# Patient Record
Sex: FEMALE | Race: WHITE
[De-identification: names, ages, dates, MRNs, and addresses within clinical notes are randomized per-mention and may not be internally consistent; named-entity substitution may affect disease eponyms.]

---

## 2019-12-11 ENCOUNTER — HOSPITAL ENCOUNTER (EMERGENCY)
Dept: HOSPITAL 41 - JD.ED | Age: 24
Discharge: HOME | End: 2019-12-11
Payer: COMMERCIAL
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
## 2019-12-11 DIAGNOSIS — F41.9: ICD-10-CM

## 2019-12-11 DIAGNOSIS — T14.8XXA: ICD-10-CM

## 2019-12-11 DIAGNOSIS — F32.9: ICD-10-CM

## 2019-12-11 DIAGNOSIS — F07.81: Primary | ICD-10-CM

## 2019-12-11 NOTE — EDM.PDOC
ED HPI GENERAL MEDICAL PROBLEM





- General


Chief Complaint: Head Injury


Stated Complaint: HEAD INJURY


Time Seen by Provider: 12/11/19 11:08


Source of Information: Reports: Patient, RN Notes Reviewed


History Limitations: Reports: No Limitations





- History of Present Illness


INITIAL COMMENTS - FREE TEXT/NARRATIVE: 





Patient is a 24-year-old female who presents to the ED with family members for 

the evaluation of a head injury.  Patient notes that around 8:00 last night, 

she slipped and fell on the ice.  She notes that she slipped backwards and hit 

the back of her head, and her back on the ice.  She denies any loss of 

consciousness or blacking out.  There is no bleeding noted as well.  She is not 

complaining of any sort of pain or injuries.  Today she is complaining of this 

inability to not concentrate, or focus, with a lot of fatigue that she does not 

normally have.  She denies any nausea or vomiting or headache, or blurred or 

double vision.  Patient does note some mild lower right rib cage pain, or upper 

abdominal pain, that is sharp if she bends over.  She noticed that this morning 

directly when she woke up.  She did not take any sort of pain medications for 

this.





- Related Data


 Allergies











Allergy/AdvReac Type Severity Reaction Status Date / Time


 


No Known Allergies Allergy   Verified 12/11/19 10:37











Home Meds: 


 Home Meds





Cholecalciferol (Vitamin D3) [Vitamin D3] 10,000 units PO DAILY 12/11/19 [

History]


Escitalopram [Lexapro] 10 mg PO DAILY 12/11/19 [History]


Spironolactone [Aldactone] 50 mg PO DAILY 12/11/19 [History]











Past Medical History


OB/GYN History: Reports: Polycystic Ovaries


Psychiatric History: Reports: Anxiety, Depression





- Infectious Disease History


Other Infectious Disease History: Refusd flu shot





Social & Family History





- Tobacco Use


Smoking Status *Q: Never Smoker


Second Hand Smoke Exposure: Yes





- Caffeine Use


Caffeine Use: Reports: Coffee





- Alcohol Use


Days Per Week of Alcohol Use: 1


Number of Drinks Per Day: 3


Total Drinks Per Week: 3





- Recreational Drug Use


Recreational Drug Use: No





ED ROS GENERAL





- Review of Systems


Review Of Systems: See Below


Constitutional: Reports: Fatigue.  Denies: Fever, Chills


Respiratory: Denies: Shortness of Breath


Cardiovascular: Reports: Chest Pain (R lower rib cage/R upper abdominal)


GI/Abdominal: Reports: Abdominal Pain (R upper abdominal pain).  Denies: Nausea

, Vomiting


Musculoskeletal: Denies: Neck Pain, Shoulder Pain, Arm Pain, Back Pain


Neurological: Denies: Confusion, Headache, Syncope, Trouble Speaking, 

Difficulty Walking





ED EXAM, HEAD INJURY





- Physical Exam


Exam: See Below


Exam Limited By: No Limitations


General Appearance: Alert, WD/WN, No Apparent Distress


Head: Atraumatic, Normocephalic


Nexus Criteria: No: Posterior, Midline Cervical Tenderness, Evidence of 

Intoxication, Altered Level of Consciousness, Focal Neurological Deficit, 

Painful Distraction Injuries


Eyes: Bilateral Eye: EOMI, Normal Inspection, PERRL


Ears: Normal External Exam, Normal Canal, Hearing Grossly Normal, Normal TMs


Nose: Normal Inspection


Throat/Mouth: Normal Inspection, Normal Lips, Normal Teeth, Normal Gums, Normal 

Oropharynx, Normal Voice, No Airway Compromise


Neck: Non-Tender, Full Range of Motion, Normal Alignment, Normal Inspection


Respiratory: No Respiratory Distress, Lungs Clear, Normal Breath Sounds, No 

Accessory Muscle Use, Chest Non-Tender


Cardiovascular: Normal Peripheral Pulses, Regular Rate, Rhythm, No Murmur


GI/Abdominal Exam: Normal Bowel Sounds, Soft, Non-Tender, No Distention, No Mass


Extremities: Normal Inspection, Normal Capillary Refill


Neurologic: CNs II-XII nml As Tested, No Motor/Sensory Deficits, Alert, Normal 

Mood/Affect, Oriented x 3


Skin: Normal Color, Warm/Dry





- [place] Coma Score


Best Eye Response (López): (4) Open Spontaneously


Best Verbal Response (López): (5) Oriented


Best Motor Response (López): (6) Obeys Commands


[place] Total: 15





Course





- Vital Signs


Last Recorded V/S: 





 Last Vital Signs











Temp  97.6 F   12/11/19 10:30


 


Pulse  81   12/11/19 10:30


 


Resp  18   12/11/19 10:30


 


BP  136/76   12/11/19 10:30


 


Pulse Ox  99   12/11/19 10:30














- Re-Assessments/Exams


Free Text/Narrative Re-Assessment/Exam: 





12/11/19 11:40


Patient presents to the ED for evaluation of a head injury.  Clinically she is 

suffering from concussion.  I believe that the right lower rib cage/upper 

abdominal pain is more musculoskeletal in nature, it is reproduced on palpation 

of the lower chest.  We will have her follow general conservative 

recommendations and discharge home at this time.





Departure





- Departure


Time of Disposition: 11:40


Disposition: Home, Self-Care 01


Clinical Impression: 


 Post concussion syndrome, Musculoskeletal strain








- Discharge Information


*PRESCRIPTION DRUG MONITORING PROGRAM REVIEWED*: No


*COPY OF PRESCRIPTION DRUG MONITORING REPORT IN PATIENT SANTIAGO: No


Instructions:  Post-Concussion Syndrome, Easy-to-Read


Referrals: 


Kacie Gil PA [Primary Care Provider] - 


Forms:  ED Department Discharge, ED Return to Work/School Form


Additional Instructions: 


You were evaluated in the ER today regarding your head injury.





At this time your symptoms are clinically consistent with concussion, this is a 

clinical diagnosis, there was no imaging studies done today to determine this, 

nor are there any imaging studies that can confirm this.





Recommend you decrease screen time, and try to rest in a dark quiet room as 

much as possible over the next few days to help heal the brain.





You can take 600 mg ibuprofen or 500 mg Tylenol every 6 hours as needed for 

further pain relief.  Do not take over 4000 mg of Tylenol or 3200 mg of 

ibuprofen in a 24-hour time span.





You should likely be able to return to school without restrictions on 12/16/ 2019.  If you feel you are not much better, you will need to be seen for re-

evaluation of your symptoms.





Please return to the ER at any time if your symptoms change or worsen





Sepsis Event Note





- Evaluation


Sepsis Screening Result: No Definite Risk





- Focused Exam


Vital Signs: 





 Vital Signs











  Temp Pulse Resp BP Pulse Ox


 


 12/11/19 10:30  97.6 F  81  18  136/76  99











Date Exam was Performed: 12/11/19


Time Exam was Performed: 11:37